# Patient Record
Sex: FEMALE | Race: BLACK OR AFRICAN AMERICAN | Employment: OTHER | URBAN - METROPOLITAN AREA
[De-identification: names, ages, dates, MRNs, and addresses within clinical notes are randomized per-mention and may not be internally consistent; named-entity substitution may affect disease eponyms.]

---

## 2019-06-12 ENCOUNTER — HOSPITAL ENCOUNTER (OUTPATIENT)
Dept: PREADMISSION TESTING | Age: 84
Discharge: HOME | End: 2019-06-12
Payer: MEDICARE

## 2019-06-12 VITALS — WEIGHT: 265 LBS | BODY MASS INDEX: 44.15 KG/M2 | HEIGHT: 65 IN

## 2019-06-12 LAB
ANION GAP SERPL CALC-SCNC: 7 MMOL/L (ref 3–18)
ATRIAL RATE: 70 BPM
BUN SERPL-MCNC: 19 MG/DL (ref 7–18)
BUN/CREAT SERPL: 13 (ref 12–20)
CALCIUM SERPL-MCNC: 9.5 MG/DL (ref 8.5–10.1)
CALCULATED P AXIS, ECG09: 56 DEGREES
CALCULATED R AXIS, ECG10: -56 DEGREES
CALCULATED T AXIS, ECG11: 59 DEGREES
CHLORIDE SERPL-SCNC: 109 MMOL/L (ref 100–108)
CO2 SERPL-SCNC: 26 MMOL/L (ref 21–32)
CREAT SERPL-MCNC: 1.46 MG/DL (ref 0.6–1.3)
DIAGNOSIS, 93000: NORMAL
GLUCOSE SERPL-MCNC: 85 MG/DL (ref 74–99)
HCT VFR BLD AUTO: 38.6 % (ref 35–45)
HGB BLD-MCNC: 11.8 G/DL (ref 12–16)
P-R INTERVAL, ECG05: 240 MS
POTASSIUM SERPL-SCNC: 4.1 MMOL/L (ref 3.5–5.5)
Q-T INTERVAL, ECG07: 402 MS
QRS DURATION, ECG06: 92 MS
QTC CALCULATION (BEZET), ECG08: 434 MS
SODIUM SERPL-SCNC: 142 MMOL/L (ref 136–145)
VENTRICULAR RATE, ECG03: 70 BPM

## 2019-06-12 PROCEDURE — 85018 HEMOGLOBIN: CPT

## 2019-06-12 PROCEDURE — 93005 ELECTROCARDIOGRAM TRACING: CPT

## 2019-06-12 PROCEDURE — 80048 BASIC METABOLIC PNL TOTAL CA: CPT

## 2019-06-12 RX ORDER — SODIUM CHLORIDE, SODIUM LACTATE, POTASSIUM CHLORIDE, CALCIUM CHLORIDE 600; 310; 30; 20 MG/100ML; MG/100ML; MG/100ML; MG/100ML
125 INJECTION, SOLUTION INTRAVENOUS CONTINUOUS
Status: CANCELLED | OUTPATIENT
Start: 2019-06-25

## 2019-06-12 RX ORDER — HYDRALAZINE HYDROCHLORIDE 50 MG/1
50 TABLET, FILM COATED ORAL 3 TIMES DAILY
COMMUNITY

## 2019-06-12 RX ORDER — ASPIRIN AND DIPYRIDAMOLE 25; 200 MG/1; MG/1
1 CAPSULE, EXTENDED RELEASE ORAL 2 TIMES DAILY
COMMUNITY

## 2019-06-12 RX ORDER — ATORVASTATIN CALCIUM 40 MG/1
40 TABLET, FILM COATED ORAL
COMMUNITY

## 2019-06-12 RX ORDER — CEFAZOLIN SODIUM/WATER 2 G/20 ML
2 SYRINGE (ML) INTRAVENOUS ONCE
Status: CANCELLED | OUTPATIENT
Start: 2019-06-25 | End: 2019-06-25

## 2019-06-12 NOTE — PERIOP NOTES
Denies sleep apnea or any complications with anesthesia hira prep reviewed patient was instructed to check with Dr Dayami Cortés PCP and hold Aggrenox per MD's instruction

## 2019-06-23 PROBLEM — N32.89 BLADDER MASS: Status: ACTIVE | Noted: 2019-06-23

## 2019-06-23 NOTE — H&P
Urology [address] [place] [address] [address]  [address]  Tel: [phone]  Fax: [phone]        Patient: Mayra Carter  YOB: 1935          Assessment/Plan  # Detail Type Description    1. Assessment Gross hematuria (R31.0). Patient Plan Her urine is sent for cytology today. She has persistent hematuria. She has an area of large raised blood vessels in the bladder and raised urothelial tissue that may or may not be malignant. We will do a cysto and TURBT small. Risk of bleeding, infection, injury and possible need for future procedures were discussed. She will proceed. This 80year old female presents for Hematuria. History of Present Illness:  1. Hematuria   The patient presents with hematuria (gross with clots). The problem began 4 weeks ago. The symptoms are intermittent. The problem is unchanged. Pertinent history includes being at least 36years of age and history of kidney stones but not history of renal cancer, history of UTIs, family history of bladder cancer, family history of renal cancer or family history of stones. Denies aggravating factors. Denies relieving factors. She denies chills, fever, nausea and vomiting. Additional information: CT (5/9/19) -- no upper tract disease; but fibroids and fluid in the endometrial canal -- she states that she had a negative biopsy with gyn already (5/30/19)      5/30/19 -- she has continued hematuria at times -- including yesterday. no pain or dysuria or new problems. PAST MEDICAL/SURGICAL HISTORY   (Reviewed, updated)    Disease/disorder Onset Date Management Date Comments     Cholecystectomy     HTN             PROBLEM LIST:   Problem List reviewed.    Problem Description Onset Date Chronic Clinical Status Notes   BMI 40.0-44.9, adult 04/11/2016 Y     TIA  Y     Benign essential hypertension 07/18/2011 Y     Morbid obesity 07/18/2011 Y           Medications (active prior to today)  Medication Instructions Start Date Stop Date Refilled Elsewhere   aspirin 25 mg-dipyridamole 200 mg capsule,ext. release 12 hr multiphase take 1 capsule by oral route 2 times every day in the morning and evening 2018 N   metoprolol tartrate 50 mg tablet take 1 tablet (50MG)  by oral route 2 times every day with meals 2018 N   hydralazine 50 mg tablet take 1 tablet by oral route 3 times every day with food 2019 N   amlodipine 10 mg tablet take 1 tablet by oral route  every day 2019 N   atorvastatin 40 mg tablet take 1 tablet by oral route  every day 2019 N   aspirin 25 mg-dipyridamole 200 mg capsule,ext. release 12 hr multiphase take 1 capsule by oral route 2 times every day in the morning and evening 2019 N     Medication Reconciliation  Medications reconciled today. Allergies  Ingredient Reaction (Severity) Medication Name Comment   ACE INHIBITORS      ARB-ANGIOTENSIN RECEPTOR ANTAGONIST      CODEINE Unknown       Reviewed, no changes. Family History  (Reviewed, updated)  Relationship Family Member Name  Age at Death Condition Onset Age Cause of Death   Father  N  Diabetes mellitus  N   Mother  N  Diabetes mellitus  N         Social History:  (Reviewed, updated)  Tobacco use reviewed. The patient is right-handed. Preferred language is [place]. Tobacco use status: Ex-cigarette smoker. Smoking status: Former smoker. SMOKING STATUS  Use Status Type Smoking Status Usage Per Day Years Used Total Pack Years   yes Cigarette Former smoker        ALCOHOL  There is no history of alcohol use. CAFFEINE  The patient uses caffeine: soda and tea. - 1 cup a day. LIFESTYLE  Sedentary activity level. Never exercises. DIET  healthy. Orthodoxy/SPIRITUAL  Patient agrees to transfusion. Review of Systems  System Neg/Pos Details   Constitutional Negative Chills and Fever. ENMT Negative Ear infections and Sore throat. Eyes Negative Blurred vision, Double vision and Eye pain. Respiratory Negative Asthma, Chronic cough, Dyspnea and Wheezing. Cardio Negative Chest pain. GI Negative Constipation, Decreased appetite, Diarrhea, Nausea and Vomiting.  Positive Hematuria. Endocrine Negative Cold intolerance, Heat intolerance, Increased thirst and Weight loss. Neuro Negative Headache and Tremors. Psych Negative Anxiety and Depression. Integumentary Negative Itching skin and Rash. MS Negative Back pain and Joint pain. Hema/Lymph Negative Easy bleeding. Vital Signs     Height  Time ft in cm Last Measured Height Position   10:54 AM 5.0 6.00 167.64 05/30/2019 Standing     Weight/BSA/BMI  Time lb oz kg Context BMI kg/m2 BSA m2   10:54 .00  113.398 dressed with shoes 40.35      Measured By  Time Measured by   10:54 AM Melvina Dawkins       Physical Exam  Exam Findings Details   Constitutional Normal Well developed. Neck Exam Normal Inspection - Normal.   Respiratory Normal Inspection - Normal.   Abdomen Normal No abdominal tenderness. Genitourinary Normal Urethral meatus - Normal. External genitalia - Normal. Glands - Normal. Perineum - Normal. Vagina - Normal. No Suprapubic tenderness. No CVA tenderness. Extremity Normal No Edema. Psychiatric Normal Orientation - Oriented to time, place, person & situation. Appropriate mood and affect. Procedures:    Cystoscopy indication:  Other. Patient consent:  Consent was obtained. The procedure and risks were explained in detail. Questions were encouraged and answered. The patient was prepped and draped in the usual sterile fashion. Procedure:  A diagnostic cystourethroscopy was performed using a 18 Croatian flexible cystoscope  Anesthesia:  No anesthesia. Patient response:  Patient tolerated procedure well. Patient was given instructions. Patient was discharged in stable condition.   Findings:  Anterior urethra normal in appearance. The bladder has lesion/mass found on the bladder. The first lesion/mass is 1.50cm is located back wall of the bladder with characteristics of sessile. Ureteral orifices normal in appearance. Antibiotics:  Antibiotics given:  Impression:  Gross hematuria R31.0. Patient Education  # Patient Education   1. Cystoscopy: Care Instructions     Medications (added, continued, or stopped today)  Start Date Medication Directions PRN Status PRN Reason Instruction Stop Date   04/23/2019 amlodipine 10 mg tablet take 1 tablet by oral route  every day N      11/26/2018 aspirin 25 mg-dipyridamole 200 mg capsule,ext. release 12 hr multiphase take 1 capsule by oral route 2 times every day in the morning and evening N      05/22/2019 aspirin 25 mg-dipyridamole 200 mg capsule,ext. release 12 hr multiphase take 1 capsule by oral route 2 times every day in the morning and evening N      05/22/2019 atorvastatin 40 mg tablet take 1 tablet by oral route  every day N      01/24/2019 hydralazine 50 mg tablet take 1 tablet by oral route 3 times every day with food N      12/26/2018 metoprolol tartrate 50 mg tablet take 1 tablet (50MG)  by oral route 2 times every day with meals N      Active Patient Care Team Members    Name Contact Agency Type Support Role Relationship Active Date Inactive Date 10 Carlosia    specialist    Ophthalmology   Meli Montez    Other Adult      Gino So   Patient provider PCP   Family Practice       Provider:       Vargas Decker MD

## 2019-06-25 ENCOUNTER — HOSPITAL ENCOUNTER (OUTPATIENT)
Age: 84
Setting detail: OUTPATIENT SURGERY
Discharge: HOME | End: 2019-06-25
Attending: UROLOGY | Admitting: UROLOGY
Payer: MEDICARE

## 2019-06-25 ENCOUNTER — ANESTHESIA EVENT (OUTPATIENT)
Dept: SURGERY | Age: 84
End: 2019-06-25
Payer: MEDICARE

## 2019-06-25 ENCOUNTER — ANESTHESIA (OUTPATIENT)
Dept: SURGERY | Age: 84
End: 2019-06-25
Payer: MEDICARE

## 2019-06-25 VITALS
OXYGEN SATURATION: 96 % | BODY MASS INDEX: 43.67 KG/M2 | TEMPERATURE: 97.4 F | RESPIRATION RATE: 18 BRPM | WEIGHT: 262.13 LBS | SYSTOLIC BLOOD PRESSURE: 139 MMHG | HEART RATE: 58 BPM | HEIGHT: 65 IN | DIASTOLIC BLOOD PRESSURE: 63 MMHG

## 2019-06-25 PROCEDURE — 74011250636 HC RX REV CODE- 250/636

## 2019-06-25 PROCEDURE — 76010000154 HC OR TIME FIRST 0.5 HR: Performed by: UROLOGY

## 2019-06-25 PROCEDURE — 74011250636 HC RX REV CODE- 250/636: Performed by: UROLOGY

## 2019-06-25 PROCEDURE — 76210000026 HC REC RM PH II 1 TO 1.5 HR: Performed by: UROLOGY

## 2019-06-25 PROCEDURE — 77030018832 HC SOL IRR H20 ICUM -A: Performed by: UROLOGY

## 2019-06-25 PROCEDURE — 76210000063 HC OR PH I REC FIRST 0.5 HR: Performed by: UROLOGY

## 2019-06-25 PROCEDURE — 77030020782 HC GWN BAIR PAWS FLX 3M -B: Performed by: UROLOGY

## 2019-06-25 PROCEDURE — 76060000031 HC ANESTHESIA FIRST 0.5 HR: Performed by: UROLOGY

## 2019-06-25 PROCEDURE — 88305 TISSUE EXAM BY PATHOLOGIST: CPT

## 2019-06-25 PROCEDURE — 77030032490 HC SLV COMPR SCD KNE COVD -B: Performed by: UROLOGY

## 2019-06-25 RX ORDER — FLUMAZENIL 0.1 MG/ML
0.2 INJECTION INTRAVENOUS
Status: DISCONTINUED | OUTPATIENT
Start: 2019-06-25 | End: 2019-06-25 | Stop reason: HOSPADM

## 2019-06-25 RX ORDER — CEFAZOLIN SODIUM/WATER 2 G/20 ML
2 SYRINGE (ML) INTRAVENOUS ONCE
Status: COMPLETED | OUTPATIENT
Start: 2019-06-25 | End: 2019-06-25

## 2019-06-25 RX ORDER — FENTANYL CITRATE 50 UG/ML
50 INJECTION, SOLUTION INTRAMUSCULAR; INTRAVENOUS
Status: DISCONTINUED | OUTPATIENT
Start: 2019-06-25 | End: 2019-06-25 | Stop reason: HOSPADM

## 2019-06-25 RX ORDER — CEPHALEXIN 500 MG/1
500 CAPSULE ORAL 2 TIMES DAILY
Qty: 6 CAP | Refills: 0 | Status: SHIPPED | OUTPATIENT
Start: 2019-06-25 | End: 2019-09-12

## 2019-06-25 RX ORDER — DEXAMETHASONE SODIUM PHOSPHATE 4 MG/ML
INJECTION, SOLUTION INTRA-ARTICULAR; INTRALESIONAL; INTRAMUSCULAR; INTRAVENOUS; SOFT TISSUE AS NEEDED
Status: DISCONTINUED | OUTPATIENT
Start: 2019-06-25 | End: 2019-06-25 | Stop reason: HOSPADM

## 2019-06-25 RX ORDER — FENTANYL CITRATE 50 UG/ML
INJECTION, SOLUTION INTRAMUSCULAR; INTRAVENOUS AS NEEDED
Status: DISCONTINUED | OUTPATIENT
Start: 2019-06-25 | End: 2019-06-25 | Stop reason: HOSPADM

## 2019-06-25 RX ORDER — LIDOCAINE HYDROCHLORIDE 20 MG/ML
INJECTION, SOLUTION EPIDURAL; INFILTRATION; INTRACAUDAL; PERINEURAL AS NEEDED
Status: DISCONTINUED | OUTPATIENT
Start: 2019-06-25 | End: 2019-06-25 | Stop reason: HOSPADM

## 2019-06-25 RX ORDER — ONDANSETRON 2 MG/ML
4 INJECTION INTRAMUSCULAR; INTRAVENOUS ONCE
Status: DISCONTINUED | OUTPATIENT
Start: 2019-06-25 | End: 2019-06-25 | Stop reason: HOSPADM

## 2019-06-25 RX ORDER — SODIUM CHLORIDE, SODIUM LACTATE, POTASSIUM CHLORIDE, CALCIUM CHLORIDE 600; 310; 30; 20 MG/100ML; MG/100ML; MG/100ML; MG/100ML
125 INJECTION, SOLUTION INTRAVENOUS CONTINUOUS
Status: DISCONTINUED | OUTPATIENT
Start: 2019-06-25 | End: 2019-06-25 | Stop reason: HOSPADM

## 2019-06-25 RX ORDER — NALOXONE HYDROCHLORIDE 0.4 MG/ML
0.4 INJECTION, SOLUTION INTRAMUSCULAR; INTRAVENOUS; SUBCUTANEOUS AS NEEDED
Status: DISCONTINUED | OUTPATIENT
Start: 2019-06-25 | End: 2019-06-25 | Stop reason: HOSPADM

## 2019-06-25 RX ORDER — SODIUM CHLORIDE, SODIUM LACTATE, POTASSIUM CHLORIDE, CALCIUM CHLORIDE 600; 310; 30; 20 MG/100ML; MG/100ML; MG/100ML; MG/100ML
100 INJECTION, SOLUTION INTRAVENOUS CONTINUOUS
Status: DISCONTINUED | OUTPATIENT
Start: 2019-06-25 | End: 2019-06-25 | Stop reason: HOSPADM

## 2019-06-25 RX ORDER — PROPOFOL 10 MG/ML
INJECTION, EMULSION INTRAVENOUS AS NEEDED
Status: DISCONTINUED | OUTPATIENT
Start: 2019-06-25 | End: 2019-06-25 | Stop reason: HOSPADM

## 2019-06-25 RX ORDER — ONDANSETRON 2 MG/ML
INJECTION INTRAMUSCULAR; INTRAVENOUS AS NEEDED
Status: DISCONTINUED | OUTPATIENT
Start: 2019-06-25 | End: 2019-06-25 | Stop reason: HOSPADM

## 2019-06-25 RX ADMIN — DEXAMETHASONE SODIUM PHOSPHATE 4 MG: 4 INJECTION, SOLUTION INTRA-ARTICULAR; INTRALESIONAL; INTRAMUSCULAR; INTRAVENOUS; SOFT TISSUE at 09:37

## 2019-06-25 RX ADMIN — Medication 2 G: at 09:34

## 2019-06-25 RX ADMIN — PROPOFOL 120 MG: 10 INJECTION, EMULSION INTRAVENOUS at 09:31

## 2019-06-25 RX ADMIN — ONDANSETRON 4 MG: 2 INJECTION INTRAMUSCULAR; INTRAVENOUS at 09:37

## 2019-06-25 RX ADMIN — LIDOCAINE HYDROCHLORIDE 100 MG: 20 INJECTION, SOLUTION EPIDURAL; INFILTRATION; INTRACAUDAL; PERINEURAL at 09:31

## 2019-06-25 RX ADMIN — FENTANYL CITRATE 50 MCG: 50 INJECTION, SOLUTION INTRAMUSCULAR; INTRAVENOUS at 09:40

## 2019-06-25 RX ADMIN — SODIUM CHLORIDE, SODIUM LACTATE, POTASSIUM CHLORIDE, AND CALCIUM CHLORIDE 125 ML/HR: 600; 310; 30; 20 INJECTION, SOLUTION INTRAVENOUS at 11:47

## 2019-06-25 RX ADMIN — SODIUM CHLORIDE, SODIUM LACTATE, POTASSIUM CHLORIDE, AND CALCIUM CHLORIDE 125 ML/HR: 600; 310; 30; 20 INJECTION, SOLUTION INTRAVENOUS at 08:26

## 2019-06-25 NOTE — PERIOP NOTES
Reviewed PTA medication list with patient/caregiver and patient/caregiver denies any additional medications. Patient admits to having a responsible adult care for them at home for at least 24 hours after surgery. Patient denies henri chewing/swallowing difficulties. Patient encouraged to use Sahil paws warming system and informed that using Sahil paws to regulate body temperature prior to a procedure has been shown to help reduce the risks of blood clots and infection. Dual skin assessment & fall risk band verification completed with Maria Elena Goodson RN.

## 2019-06-25 NOTE — BRIEF OP NOTE
BRIEF OPERATIVE NOTE    Date of Procedure: 6/25/2019   Preoperative Diagnosis: GROSS HEMATURIA, NEOPLASM OF UNCERTAIN BEHAVIOR OF BLADDER  Postoperative Diagnosis: GROSS HEMATURIA, NEOPLASM OF UNCERTAIN BEHAVIOR OF BLADDER    Procedure(s):  CYSTOSCOPY,TRANSURETHRAL RESECTION OF BLADDER TUMOR-SMALL  Surgeon(s) and Role:     * Lisa Valdes MD - Primary         Surgical Assistant: NONE    Surgical Staff:  Circ-1: Diamante Lynn  Scrub Tech-1: Sarah Diallo  Scrub Tech-2: Gabrielle Obando  Event Time In Time Out   Incision Start 6172    Incision Close 0204      Anesthesia: General   Estimated Blood Loss: MINIMAL  Specimens:   ID Type Source Tests Collected by Time Destination   1 : bladder erythema Preservative Bladder  Lisa Valdes MD 6/25/2019 9530 Pathology      Findings: AREA OF SLIGHTLY RAISED ERYTHEMA ON POSTERIOR WALL TOWARD DOME WAS RESECTED AND CAUTERIZED   Complications: NONE  Implants: * No implants in log *

## 2019-06-25 NOTE — PERIOP NOTES
Dr. Saintclair Funk at bedside to observe pt neuro status, pt is stable and Dr. Saintclair Funk signed pt out of PACU to go to phase 2 of care

## 2019-06-25 NOTE — ADDENDUM NOTE
Addendum  created 06/25/19 1202 by Caroline Monroy CRNA    Intraprocedure LDAs edited, LDA properties accepted

## 2019-06-25 NOTE — PERIOP NOTES
TRANSFER - OUT REPORT:    Verbal report given to Antony Seip, RN (name) on Greg Shen  being transferred to phase 2 (unit) for routine post - op       Report consisted of patients Situation, Background, Assessment and   Recommendations(SBAR). Information from the following report(s) SBAR, Intake/Output and MAR was reviewed with the receiving nurse. Lines:   Peripheral IV 06/25/19 Left; Anterior Wrist (Active)   Site Assessment Clean, dry, & intact 6/25/2019 10:19 AM   Phlebitis Assessment 0 6/25/2019 10:19 AM   Infiltration Assessment 0 6/25/2019 10:19 AM   Dressing Status Clean, dry, & intact 6/25/2019 10:19 AM   Dressing Type Transparent;Tape 6/25/2019 10:19 AM   Hub Color/Line Status Pink; Infusing 6/25/2019 10:19 AM   Action Taken Armboard 6/25/2019  9:39 AM   Alcohol Cap Used No 6/25/2019  8:25 AM        Opportunity for questions and clarification was provided.       Patient transported with:   [employer]

## 2019-06-25 NOTE — PERIOP NOTES
Arrived to Phase 2 - alert with slow responses - ( normal for pt post stroke in 2015 ) oriented to person , place - assisted to recliner - no c/o offered

## 2019-06-25 NOTE — PERIOP NOTES
Patient resting on chair; new bag of IVF infusing and patient tolerating PO fluids. Patient and family aware that she needs to urinate prior to d/c home; denies need for bathroom at this time.

## 2019-06-25 NOTE — INTERVAL H&P NOTE
H&P Update: 
Mel Lackey was seen and examined. History and physical has been reviewed. The patient has been examined.  There have been no significant clinical changes since the completion of the originally dated History and Physical.

## 2019-06-25 NOTE — DISCHARGE INSTRUCTIONS
Drink plenty of fluids to keep the urine clear  Resume pre hospital diet  Ambulate in house  Shower tomorrow  Take prescription as directed  Dr. Ross Jha office will call with follow up appointment        DISCHARGE SUMMARY from Nurse    PATIENT INSTRUCTIONS:    After general anesthesia or intravenous sedation, for 24 hours or while taking prescription Narcotics:  · Limit your activities  · Do not drive and operate hazardous machinery  · Do not make important personal or business decisions  · Do  not drink alcoholic beverages  · If you have not urinated within 8 hours after discharge, please contact your surgeon on call. Report the following to your surgeon:  · Excessive pain, swelling, redness or odor of or around the surgical area  · Temperature over 100.5  · Nausea and vomiting lasting longer than 4 hours or if unable to take medications  · Any signs of decreased circulation or nerve impairment to extremity: change in color, persistent  numbness, tingling, coldness or increase pain  · Any questions    What to do at Home:  Recommended activity: Ambulate in house and No driving while on analgesics,     If you experience any of the following symptoms fever, chills, uncontrollable pain , active bleeding , difficulty urinating, please follow up with Dr. Nikolay Maria. *  Please give a list of your current medications to your Primary Care Provider. *  Please update this list whenever your medications are discontinued, doses are      changed, or new medications (including over-the-counter products) are added. *  Please carry medication information at all times in case of emergency situations. These are general instructions for a healthy lifestyle:    No smoking/ No tobacco products/ Avoid exposure to second hand smoke  Surgeon General's Warning:  Quitting smoking now greatly reduces serious risk to your health.     Obesity, smoking, and sedentary lifestyle greatly increases your risk for illness    A healthy diet, regular physical exercise & weight monitoring are important for maintaining a healthy lifestyle    You may be retaining fluid if you have a history of heart failure or if you experience any of the following symptoms:  Weight gain of 3 pounds or more overnight or 5 pounds in a week, increased swelling in our hands or feet or shortness of breath while lying flat in bed. Please call your doctor as soon as you notice any of these symptoms; do not wait until your next office visit. Patient armband removed and shredded    The discharge information has been reviewed with the patient and caregiver. The patient and caregiver verbalized understanding. Discharge medications reviewed with the patient and caregiver and appropriate educational materials and side effects teaching were provided.   ___________________________________________________________________________________________________________________________________

## 2019-06-25 NOTE — ANESTHESIA POSTPROCEDURE EVALUATION
Post-Anesthesia Evaluation & Assessment    Visit Vitals  /65   Pulse (!) 57   Temp 37.6 °C (99.7 °F)   Resp 16   Ht 5' 5\" (1.651 m)   Wt 118.9 kg (262 lb 2 oz)   SpO2 94%   BMI 43.62 kg/m²       Nausea/Vomiting: Controlled. Post-operative hydration adequate. Pain Scale 1: Numeric (0 - 10) (06/25/19 1017)  Pain Intensity 1: 0 (06/25/19 1017)   Managed    Pain score at or below stated goal level. Mental status & Level of consciousness: alert and oriented x 3    Neurological status: moves all extremities, sensation grossly intact    Pulmonary status: airway patent, adequate oxygenation. Complications related to anesthesia: none    Patient has met all PACU discharge requirements.       Kamryn Parra, DO

## 2019-06-25 NOTE — PERIOP NOTES
Discharge instructions completed - opportunity for questions - AVS med list reviewed - pt assisted  to BR - voided 225 ml clear yellow urine

## 2019-06-25 NOTE — ANESTHESIA PREPROCEDURE EVALUATION
Relevant Problems   No relevant active problems       Anesthetic History   No history of anesthetic complications            Review of Systems / Medical History  Patient summary reviewed, nursing notes reviewed and pertinent labs reviewed    Pulmonary              Pertinent negatives: No COPD, asthma, recent URI and sleep apnea  Comments: Former smoker   Neuro/Psych       CVA  TIA  Pertinent negatives: No seizures and neuromuscular disease   Cardiovascular    Hypertension: well controlled            Pertinent negatives: No past MI, CAD, PAD, dysrhythmias, angina and CHF  Exercise tolerance: <4 METS     GI/Hepatic/Renal     GERD        Pertinent negatives: No hepatitis, liver disease and renal disease  Comments: Rare, food specific GERD Endo/Other        Morbid obesity  Pertinent negatives: No diabetes, hypothyroidism, hyperthyroidism and blood dyscrasia   Other Findings            Physical Exam    Airway  Mallampati: III  TM Distance: 4 - 6 cm  Neck ROM: decreased range of motion   Mouth opening: Normal     Cardiovascular  Regular rate and rhythm,  S1 and S2 normal,  no murmur, click, rub, or gallop             Dental    Dentition: Edentulous     Pulmonary  Breath sounds clear to auscultation               Abdominal  GI exam deferred       Other Findings            Anesthetic Plan    ASA: 3  Anesthesia type: general          Induction: Intravenous  Anesthetic plan and risks discussed with: Patient and Family      GA/LMA

## 2019-06-26 NOTE — OP NOTES
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 OPERATION PERFORMED:  Cystoscopy, transurethral resection of bladder tumor, small. SURGEON:  Amara Ghosh MD    ASSISTANT:  None. ANESTHESIA:  General.    COMPLICATIONS:  None. SPECIMENS REMOVED:  Bladder erythema. IMPLANTS:  None. ESTIMATED BLOOD LOSS:  Minimal.    FINDINGS:  The patient had an area of slightly raised erythema on posterior wall towards the dome that was resected and cauterized. CLINICAL NOTE:  The patient is an 43-year-old female with a history of gross hematuria, who was found to have an area of raised erythema, who presents for TURBT. The area was about 1 cm in size. OPERATIVE REPORT:  Preoperatively, the risks and benefits of surgery were described to the patient. The risks included not limited to bleeding, infection, injury to the bladder, injury to the urethra, injury to the ureter, possible need for future procedures. The patient understood the risks and signed the informed consent. The patient was taken to the operating room, placed on the OR table in supine position. She was administered general anesthetic. She was administered intravenous antibiotics. She was placed in dorsal lithotomy position and prepped and draped in the usual sterile manner. A 21-Mohawk cystoscope and sheath were then inserted transurethrally atraumatically under direct vision using a 30-degree lens. Panendoscopy was done. The urethra was normal.  The bladder showed bilateral ureteral orifices in normal anatomic position. There were no stones within the bladder. There were no papillary masses.   There was an abnormal area of raised erythema 1 cm in diameter on the posterior bladder wall shading towards the left side towards the dome. This actually looked a little bit better than in the office a couple of weeks prior. There were 2 large feeding blood vessels into the area coming from the lateral sides. Using cup biopsy forceps, this area was resected in the usual manner. The entire area was resected. All bleeding was then controlled with Bugbee electrocautery. The area was inspected. There was no residual erythema left and no areas of concern, otherwise. There was no active bleeding. The patient had her bladder emptied and the area was reinspected again and there was no bleeding. At this point, the patient had the bladder emptied and the scope removed. The patient was taken out of the lithotomy position, revived from anesthesia, and taken to recovery in a stable condition.       MD GAURANG [email or web address]/[email or web address]  D:  06/25/2019 10:19  T:  06/25/2019 11:06  JOB #:  [id number]

## 2019-09-12 ENCOUNTER — HOSPITAL ENCOUNTER (OUTPATIENT)
Dept: PREADMISSION TESTING | Age: 84
Discharge: HOME | End: 2019-09-12
Payer: MEDICARE

## 2019-09-12 LAB
ALBUMIN SERPL-MCNC: 3.2 G/DL (ref 3.4–5)
ALBUMIN/GLOB SERPL: 0.8 {RATIO} (ref 0.8–1.7)
ALP SERPL-CCNC: 153 U/L (ref 45–117)
ALT SERPL-CCNC: 41 U/L (ref 13–56)
ANION GAP SERPL CALC-SCNC: 3 MMOL/L (ref 3–18)
AST SERPL-CCNC: 34 U/L (ref 10–38)
ATRIAL RATE: 69 BPM
BASOPHILS # BLD: 0 K/UL (ref 0–0.1)
BASOPHILS NFR BLD: 0 % (ref 0–2)
BILIRUB SERPL-MCNC: 0.4 MG/DL (ref 0.2–1)
BUN SERPL-MCNC: 18 MG/DL (ref 7–18)
BUN/CREAT SERPL: 13 (ref 12–20)
CALCIUM SERPL-MCNC: 9.1 MG/DL (ref 8.5–10.1)
CALCULATED P AXIS, ECG09: 48 DEGREES
CALCULATED R AXIS, ECG10: -52 DEGREES
CALCULATED T AXIS, ECG11: 58 DEGREES
CHLORIDE SERPL-SCNC: 110 MMOL/L (ref 100–111)
CO2 SERPL-SCNC: 28 MMOL/L (ref 21–32)
CREAT SERPL-MCNC: 1.41 MG/DL (ref 0.6–1.3)
DIAGNOSIS, 93000: NORMAL
DIFFERENTIAL METHOD BLD: ABNORMAL
EOSINOPHIL # BLD: 0.1 K/UL (ref 0–0.4)
EOSINOPHIL NFR BLD: 1 % (ref 0–5)
ERYTHROCYTE [DISTWIDTH] IN BLOOD BY AUTOMATED COUNT: 17.7 % (ref 11.6–14.5)
GLOBULIN SER CALC-MCNC: 4 G/DL (ref 2–4)
GLUCOSE SERPL-MCNC: 89 MG/DL (ref 74–99)
HCT VFR BLD AUTO: 39.2 % (ref 35–45)
HGB BLD-MCNC: 12.4 G/DL (ref 12–16)
LYMPHOCYTES # BLD: 2 K/UL (ref 0.9–3.6)
LYMPHOCYTES NFR BLD: 30 % (ref 21–52)
MCH RBC QN AUTO: 28.1 PG (ref 24–34)
MCHC RBC AUTO-ENTMCNC: 31.6 G/DL (ref 31–37)
MCV RBC AUTO: 88.9 FL (ref 74–97)
MONOCYTES # BLD: 0.7 K/UL (ref 0.05–1.2)
MONOCYTES NFR BLD: 10 % (ref 3–10)
NEUTS SEG # BLD: 4 K/UL (ref 1.8–8)
NEUTS SEG NFR BLD: 59 % (ref 40–73)
P-R INTERVAL, ECG05: 228 MS
PLATELET # BLD AUTO: 220 K/UL (ref 135–420)
PMV BLD AUTO: 10 FL (ref 9.2–11.8)
POTASSIUM SERPL-SCNC: 4.2 MMOL/L (ref 3.5–5.5)
PROT SERPL-MCNC: 7.2 G/DL (ref 6.4–8.2)
Q-T INTERVAL, ECG07: 404 MS
QRS DURATION, ECG06: 90 MS
QTC CALCULATION (BEZET), ECG08: 432 MS
RBC # BLD AUTO: 4.41 M/UL (ref 4.2–5.3)
SODIUM SERPL-SCNC: 141 MMOL/L (ref 136–145)
VENTRICULAR RATE, ECG03: 69 BPM
WBC # BLD AUTO: 6.8 K/UL (ref 4.6–13.2)

## 2019-09-12 PROCEDURE — 80053 COMPREHEN METABOLIC PANEL: CPT

## 2019-09-12 PROCEDURE — 93005 ELECTROCARDIOGRAM TRACING: CPT

## 2019-09-12 PROCEDURE — 85025 COMPLETE CBC W/AUTO DIFF WBC: CPT

## 2019-09-12 PROCEDURE — 36415 COLL VENOUS BLD VENIPUNCTURE: CPT

## 2019-09-12 NOTE — PERIOP NOTES
Called office ,LM for Lobito Marinelli requesting Hp and Consents to be fax over as well as I fax over request forms for them to.

## 2019-09-13 NOTE — H&P (VIEW-ONLY)
[address]  [address]  Tel: [phone]  Fax: [phone]      PATIENT:  Elease Aschoff  YOB: 1935  DATE:   07/25/2019 4:45 PM   VISIT TYPE: Office Visit - GYN        Assessment/Plan  # Detail Type Description    1. Assessment Postmenopausal bleeding (N95.0). Patient Plan US shows potential polyp. Reviewed with patient. Recommended D&C/Hysteroscopy Patient will schedule for surgery. Reviewed risks. 2. Assessment Body mass index (BMI) 40.0-44.9, adult (Z68.41). This 80year old female presents for Postmenopausal bleeding:. History of Present Illness:  1. Postmenopausal bleeding: The symptoms began 3 weeks ago and generally lasts varies. The symptoms are reported as being mild. The symptoms occur constantly. The location is uterine. The symptoms are described as painless. Aggravating factors include none. Relieving factors include none. She states the symptoms are acute and are of new onset. pt here for eval for bleeding not related to hematuria. pt referred by Dr. Kj Albert for further eval and tx.               Past Systemic History    Medical History (Detailed)  Disease Onset Date Comments   HTN         Surgical History/Management (Detailed)  Management Date Comments   Cholecystectomy     Diagnostics History:  Status Study Ordered Completed Interpretation  Result / Report   completed * Dexa, Bone Density Scan 04/06/2015 04/28/2015 normal  see module   completed * Screening mammography digital 04/06/2015 05/08/2015 normal  see module   completed * Screening mammography digital Bilateral 09/17/2013 10/01/2013 normal  see scanned report   completed COLONOSCOPY AND BIOPSY  07/24/2013 REFUSED  REFUSED   result received CT Abdomen And Pelvis W/and W/O 05/06/2019  See module     ordered Screening mammography digital 09/11/2017             Interim History:  Type Date Problem Management    06/08/2015 TIA      PROBLEM LIST: Problem List reviewed. Problem Description Onset Date Chronic Clinical Status Notes   BMI 40.0-44.9, adult 2016 Y     TIA  Y     Benign essential hypertension 2011 Y     Morbid obesity 2011 Y           Medications (active prior to today)  Medication Instructions Start Date Stop Date Refilled Elsewhere   aspirin 25 mg-dipyridamole 200 mg capsule,ext. release 12 hr multiphase take 1 capsule by oral route 2 times every day in the morning and evening 2018 N   hydralazine 50 mg tablet take 1 tablet by oral route 3 times every day with food 2019 N   amlodipine 10 mg tablet take 1 tablet by oral route  every day 2019 N   atorvastatin 40 mg tablet take 1 tablet by oral route  every day 2019 N   aspirin 25 mg-dipyridamole 200 mg capsule,ext. release 12 hr multiphase take 1 capsule by oral route 2 times every day in the morning and evening 2019 N   metoprolol tartrate 50 mg tablet take 1 tablet (50MG)  by oral route 2 times every day with meals 2019 N     Medication Reconciliation  Medications reconciled today. Allergies:  Ingredient Reaction (Severity) Medication Name Comment   ACE INHIBITORS      ARB-ANGIOTENSIN RECEPTOR ANTAGONIST      CODEINE Unknown         Family History  (Detailed)  Relationship Family Member Name  Age at Death Condition Onset Age Cause of Death   Father  N  Diabetes mellitus  N   Mother  N  Diabetes mellitus  N     Social History:  (Detailed)  The patient is right-handed. Preferred language is [place]. Tobacco use status: Ex-cigarette smoker. Smoking status: Former smoker. SMOKING STATUS  Use Status Type Smoking Status Usage Per Day Years Used Total Pack Years   yes Cigarette Former smoker        ALCOHOL  There is no history of alcohol use. CAFFEINE  The patient uses caffeine: soda and tea. - 1 cup a day.   LIFESTYLE  Sedentary activity level.  Never exercises. DIET  healthy. Latter day/SPIRITUAL  Patient agrees to transfusion. Review of Systems  System Neg/Pos Details   Constitutional Negative Fever, Night sweats, Weight gain and Weight loss. Respiratory Negative Dyspnea. Cardio Negative Chest pain and Edema. GI Negative Abdominal pain and Constipation.  Negative Dysuria, Hematuria, Urinary frequency and Urinary incontinence. Endocrine Negative Cold intolerance and Heat intolerance. Neuro Negative Headache. Psych Negative Anxiety and Depression. Integumentary Negative Rash. MS Negative Back pain. Hema/Lymph Negative Easy bleeding and Easy bruising. Reproductive Negative Dysmenorrhea, Dyspareunia, Hot flashes, Irregular menses and Vaginal discharge. Vital Signs     Height  Time ft in cm Last Measured Height Position   5:16 PM 5.0 6.00 167.64 07/25/2019 Standing     Weight/BSA/BMI  Time lb oz kg Context BMI kg/m2 BSA m2   5:16 .00  117.934  41.96 2.34     Blood Pressure  Time BP mm/Hg Position Side Site Method Cuff Size   5:16 /80 sitting left   adult large     Measured By  Time Measured by   5:16 PM Margarita Murrieta       Physical Exam  Exam Findings Details   Constitutional Normal Well developed. Neck Exam Normal Palpation - Normal. Thyroid gland - Normal.   Abdomen Normal Anterior palpation -  No rebound. No abdominal tenderness. No hernia. Genitourinary * External genitalia - atrophic. Obesity limits exam accuracy. Genitourinary Normal Urethral meatus - Normal. Glands - Normal. Perineum - Normal. Anus - Normal. Vagina - Normal. Cervix - Normal. Uterus - Normal. Adnexa - Normal. Bladder - Normal.   Skin Normal Inspection - Normal.   Spine * Inspection - no abnormality. Psychiatric Normal Orientation - Oriented to time, place, person & situation. Appropriate mood and affect.          Medications (Added, Continued or Stopped today):  Start Date Medication Directions PRN Status PRN Reason Instruction Stop Date   04/23/2019 amlodipine 10 mg tablet take 1 tablet by oral route  every day N      11/26/2018 aspirin 25 mg-dipyridamole 200 mg capsule,ext. release 12 hr multiphase take 1 capsule by oral route 2 times every day in the morning and evening N      05/22/2019 aspirin 25 mg-dipyridamole 200 mg capsule,ext. release 12 hr multiphase take 1 capsule by oral route 2 times every day in the morning and evening N   07/25/2019 05/22/2019 atorvastatin 40 mg tablet take 1 tablet by oral route  every day N      01/24/2019 hydralazine 50 mg tablet take 1 tablet by oral route 3 times every day with food N   07/29/2019 07/29/2019 HYDRALAZINE 50 MG TABLET take 1 tablet by mouth three times a day N      06/21/2019 metoprolol tartrate 50 mg tablet take 1 tablet (50MG)  by oral route 2 times every day with meals N            The patient was checked out at 5:34 PM by Pipo Slater. Active Patient Care Team Members    Name Contact Agency Type Support Role Relationship Active Date Inactive Date Specialty    [facility]   specialist    Ophthalmology   Randy Call    Other Adult      Tammy Castañeda   Patient provider PCP   Family Practice     Provider: Hilary Burkitt. Raudel Aguirre DO 07/25/2019 04:45 PM  Document generated by:  Hilary Burkitt. Raudel Shall 08/29/2019 11:30 PM    Electronically signed by Hilary Burkitt. Morrison DO on 08/30/2019 06:44 AM trying to walk up,movement

## 2019-09-13 NOTE — H&P
[address]  [address]  Tel: [phone]  Fax: [phone]      PATIENT:  Julieta Henriquez  YOB: 1935  DATE:   07/25/2019 4:45 PM   VISIT TYPE: Office Visit - GYN        Assessment/Plan  # Detail Type Description    1. Assessment Postmenopausal bleeding (N95.0). Patient Plan US shows potential polyp. Reviewed with patient. Recommended D&C/Hysteroscopy Patient will schedule for surgery. Reviewed risks. 2. Assessment Body mass index (BMI) 40.0-44.9, adult (Z68.41). This 80year old female presents for Postmenopausal bleeding:. History of Present Illness:  1. Postmenopausal bleeding: The symptoms began 3 weeks ago and generally lasts varies. The symptoms are reported as being mild. The symptoms occur constantly. The location is uterine. The symptoms are described as painless. Aggravating factors include none. Relieving factors include none. She states the symptoms are acute and are of new onset. pt here for eval for bleeding not related to hematuria. pt referred by Dr. Tania Rdz for further eval and tx.               Past Systemic History    Medical History (Detailed)  Disease Onset Date Comments   HTN         Surgical History/Management (Detailed)  Management Date Comments   Cholecystectomy     Diagnostics History:  Status Study Ordered Completed Interpretation  Result / Report   completed * Dexa, Bone Density Scan 04/06/2015 04/28/2015 normal  see module   completed * Screening mammography digital 04/06/2015 05/08/2015 normal  see module   completed * Screening mammography digital Bilateral 09/17/2013 10/01/2013 normal  see scanned report   completed COLONOSCOPY AND BIOPSY  07/24/2013 REFUSED  REFUSED   result received CT Abdomen And Pelvis W/and W/O 05/06/2019  See module     ordered Screening mammography digital 09/11/2017             Interim History:  Type Date Problem Management    06/08/2015 TIA      PROBLEM LIST: Problem List reviewed. Problem Description Onset Date Chronic Clinical Status Notes   BMI 40.0-44.9, adult 2016 Y     TIA  Y     Benign essential hypertension 2011 Y     Morbid obesity 2011 Y           Medications (active prior to today)  Medication Instructions Start Date Stop Date Refilled Elsewhere   aspirin 25 mg-dipyridamole 200 mg capsule,ext. release 12 hr multiphase take 1 capsule by oral route 2 times every day in the morning and evening 2018 N   hydralazine 50 mg tablet take 1 tablet by oral route 3 times every day with food 2019 N   amlodipine 10 mg tablet take 1 tablet by oral route  every day 2019 N   atorvastatin 40 mg tablet take 1 tablet by oral route  every day 2019 N   aspirin 25 mg-dipyridamole 200 mg capsule,ext. release 12 hr multiphase take 1 capsule by oral route 2 times every day in the morning and evening 2019 N   metoprolol tartrate 50 mg tablet take 1 tablet (50MG)  by oral route 2 times every day with meals 2019 N     Medication Reconciliation  Medications reconciled today. Allergies:  Ingredient Reaction (Severity) Medication Name Comment   ACE INHIBITORS      ARB-ANGIOTENSIN RECEPTOR ANTAGONIST      CODEINE Unknown         Family History  (Detailed)  Relationship Family Member Name  Age at Death Condition Onset Age Cause of Death   Father  N  Diabetes mellitus  N   Mother  N  Diabetes mellitus  N     Social History:  (Detailed)  The patient is right-handed. Preferred language is [place]. Tobacco use status: Ex-cigarette smoker. Smoking status: Former smoker. SMOKING STATUS  Use Status Type Smoking Status Usage Per Day Years Used Total Pack Years   yes Cigarette Former smoker        ALCOHOL  There is no history of alcohol use. CAFFEINE  The patient uses caffeine: soda and tea. - 1 cup a day.   LIFESTYLE  Sedentary activity level.  Never exercises. DIET  healthy. Oriental orthodox/SPIRITUAL  Patient agrees to transfusion. Review of Systems  System Neg/Pos Details   Constitutional Negative Fever, Night sweats, Weight gain and Weight loss. Respiratory Negative Dyspnea. Cardio Negative Chest pain and Edema. GI Negative Abdominal pain and Constipation.  Negative Dysuria, Hematuria, Urinary frequency and Urinary incontinence. Endocrine Negative Cold intolerance and Heat intolerance. Neuro Negative Headache. Psych Negative Anxiety and Depression. Integumentary Negative Rash. MS Negative Back pain. Hema/Lymph Negative Easy bleeding and Easy bruising. Reproductive Negative Dysmenorrhea, Dyspareunia, Hot flashes, Irregular menses and Vaginal discharge. Vital Signs     Height  Time ft in cm Last Measured Height Position   5:16 PM 5.0 6.00 167.64 07/25/2019 Standing     Weight/BSA/BMI  Time lb oz kg Context BMI kg/m2 BSA m2   5:16 .00  117.934  41.96 2.34     Blood Pressure  Time BP mm/Hg Position Side Site Method Cuff Size   5:16 /80 sitting left   adult large     Measured By  Time Measured by   5:16 PM Michelle Pale       Physical Exam  Exam Findings Details   Constitutional Normal Well developed. Neck Exam Normal Palpation - Normal. Thyroid gland - Normal.   Abdomen Normal Anterior palpation -  No rebound. No abdominal tenderness. No hernia. Genitourinary * External genitalia - atrophic. Obesity limits exam accuracy. Genitourinary Normal Urethral meatus - Normal. Glands - Normal. Perineum - Normal. Anus - Normal. Vagina - Normal. Cervix - Normal. Uterus - Normal. Adnexa - Normal. Bladder - Normal.   Skin Normal Inspection - Normal.   Spine * Inspection - no abnormality. Psychiatric Normal Orientation - Oriented to time, place, person & situation. Appropriate mood and affect.          Medications (Added, Continued or Stopped today):  Start Date Medication Directions PRN Status PRN Reason Instruction Stop Date   04/23/2019 amlodipine 10 mg tablet take 1 tablet by oral route  every day N      11/26/2018 aspirin 25 mg-dipyridamole 200 mg capsule,ext. release 12 hr multiphase take 1 capsule by oral route 2 times every day in the morning and evening N      05/22/2019 aspirin 25 mg-dipyridamole 200 mg capsule,ext. release 12 hr multiphase take 1 capsule by oral route 2 times every day in the morning and evening N   07/25/2019 05/22/2019 atorvastatin 40 mg tablet take 1 tablet by oral route  every day N      01/24/2019 hydralazine 50 mg tablet take 1 tablet by oral route 3 times every day with food N   07/29/2019 07/29/2019 HYDRALAZINE 50 MG TABLET take 1 tablet by mouth three times a day N      06/21/2019 metoprolol tartrate 50 mg tablet take 1 tablet (50MG)  by oral route 2 times every day with meals N            The patient was checked out at 5:34 PM by Shonda Hanson. Active Patient Care Team Members    Name Contact Agency Type Support Role Relationship Active Date Inactive Date Specialty    [facility]   specialist    Ophthalmology   [facility]    Other Adult      Ramos Angelica   Patient provider PCP   Family Practice     Provider: Dustin Will. Moe Trujillo DO 07/25/2019 04:45 PM  Document generated by:  Dustin Will. Moe Trujillo 08/29/2019 11:30 PM    Electronically signed by Dustin Will.  Felipe CHANDRA on 08/30/2019 06:44 AM

## 2019-09-15 ENCOUNTER — ANESTHESIA EVENT (OUTPATIENT)
Dept: SURGERY | Age: 84
End: 2019-09-15
Payer: MEDICARE

## 2019-09-15 RX ORDER — SODIUM CHLORIDE 0.9 % (FLUSH) 0.9 %
5-40 SYRINGE (ML) INJECTION EVERY 8 HOURS
Status: CANCELLED | OUTPATIENT
Start: 2019-09-15

## 2019-09-15 RX ORDER — FENTANYL CITRATE 50 UG/ML
50 INJECTION, SOLUTION INTRAMUSCULAR; INTRAVENOUS
Status: CANCELLED | OUTPATIENT
Start: 2019-09-15

## 2019-09-15 RX ORDER — NALOXONE HYDROCHLORIDE 0.4 MG/ML
0.1 INJECTION, SOLUTION INTRAMUSCULAR; INTRAVENOUS; SUBCUTANEOUS AS NEEDED
Status: CANCELLED | OUTPATIENT
Start: 2019-09-15

## 2019-09-15 RX ORDER — FENTANYL CITRATE 50 UG/ML
25 INJECTION, SOLUTION INTRAMUSCULAR; INTRAVENOUS AS NEEDED
Status: CANCELLED | OUTPATIENT
Start: 2019-09-15

## 2019-09-15 RX ORDER — FLUMAZENIL 0.1 MG/ML
0.2 INJECTION INTRAVENOUS
Status: CANCELLED | OUTPATIENT
Start: 2019-09-15

## 2019-09-15 RX ORDER — MAGNESIUM SULFATE 100 %
4 CRYSTALS MISCELLANEOUS AS NEEDED
Status: CANCELLED | OUTPATIENT
Start: 2019-09-15

## 2019-09-15 RX ORDER — SODIUM CHLORIDE, SODIUM LACTATE, POTASSIUM CHLORIDE, CALCIUM CHLORIDE 600; 310; 30; 20 MG/100ML; MG/100ML; MG/100ML; MG/100ML
1000 INJECTION, SOLUTION INTRAVENOUS CONTINUOUS
Status: CANCELLED | OUTPATIENT
Start: 2019-09-15

## 2019-09-15 RX ORDER — SODIUM CHLORIDE 0.9 % (FLUSH) 0.9 %
5-40 SYRINGE (ML) INJECTION AS NEEDED
Status: CANCELLED | OUTPATIENT
Start: 2019-09-15

## 2019-09-15 RX ORDER — DEXTROSE MONOHYDRATE 100 MG/ML
125-250 INJECTION, SOLUTION INTRAVENOUS AS NEEDED
Status: CANCELLED | OUTPATIENT
Start: 2019-09-15

## 2019-09-16 ENCOUNTER — ANESTHESIA (OUTPATIENT)
Dept: SURGERY | Age: 84
End: 2019-09-16
Payer: MEDICARE

## 2019-09-16 ENCOUNTER — HOSPITAL ENCOUNTER (OUTPATIENT)
Age: 84
Setting detail: OUTPATIENT SURGERY
Discharge: HOME | End: 2019-09-16
Attending: OBSTETRICS & GYNECOLOGY | Admitting: OBSTETRICS & GYNECOLOGY
Payer: MEDICARE

## 2019-09-16 VITALS
HEIGHT: 66 IN | BODY MASS INDEX: 42.02 KG/M2 | SYSTOLIC BLOOD PRESSURE: 141 MMHG | RESPIRATION RATE: 18 BRPM | WEIGHT: 261.44 LBS | DIASTOLIC BLOOD PRESSURE: 63 MMHG | HEART RATE: 58 BPM | TEMPERATURE: 97.4 F | OXYGEN SATURATION: 95 %

## 2019-09-16 DIAGNOSIS — Z09 SURGERY FOLLOW-UP: Primary | ICD-10-CM

## 2019-09-16 PROCEDURE — 77030020782 HC GWN BAIR PAWS FLX 3M -B: Performed by: OBSTETRICS & GYNECOLOGY

## 2019-09-16 PROCEDURE — 74011250636 HC RX REV CODE- 250/636

## 2019-09-16 PROCEDURE — 77030019927 HC TBNG IRR CYSTO BAXT -A: Performed by: OBSTETRICS & GYNECOLOGY

## 2019-09-16 PROCEDURE — 76060000031 HC ANESTHESIA FIRST 0.5 HR: Performed by: OBSTETRICS & GYNECOLOGY

## 2019-09-16 PROCEDURE — 88305 TISSUE EXAM BY PATHOLOGIST: CPT

## 2019-09-16 PROCEDURE — 77030012508 HC MSK AIRWY LMA AMBU -A: Performed by: ANESTHESIOLOGY

## 2019-09-16 PROCEDURE — 77030040361 HC SLV COMPR DVT MDII -B: Performed by: OBSTETRICS & GYNECOLOGY

## 2019-09-16 PROCEDURE — 74011250636 HC RX REV CODE- 250/636: Performed by: OBSTETRICS & GYNECOLOGY

## 2019-09-16 PROCEDURE — 76210000006 HC OR PH I REC 0.5 TO 1 HR: Performed by: OBSTETRICS & GYNECOLOGY

## 2019-09-16 PROCEDURE — 76010000154 HC OR TIME FIRST 0.5 HR: Performed by: OBSTETRICS & GYNECOLOGY

## 2019-09-16 PROCEDURE — 77030005537 HC CATH URETH BARD -A: Performed by: OBSTETRICS & GYNECOLOGY

## 2019-09-16 PROCEDURE — 74011000250 HC RX REV CODE- 250

## 2019-09-16 PROCEDURE — 77030020255 HC SOL INJ LR 1000ML BG: Performed by: OBSTETRICS & GYNECOLOGY

## 2019-09-16 PROCEDURE — 76210000021 HC REC RM PH II 0.5 TO 1 HR: Performed by: OBSTETRICS & GYNECOLOGY

## 2019-09-16 RX ORDER — LIDOCAINE HYDROCHLORIDE 20 MG/ML
INJECTION, SOLUTION EPIDURAL; INFILTRATION; INTRACAUDAL; PERINEURAL AS NEEDED
Status: DISCONTINUED | OUTPATIENT
Start: 2019-09-16 | End: 2019-09-16 | Stop reason: HOSPADM

## 2019-09-16 RX ORDER — SODIUM CHLORIDE, SODIUM LACTATE, POTASSIUM CHLORIDE, CALCIUM CHLORIDE 600; 310; 30; 20 MG/100ML; MG/100ML; MG/100ML; MG/100ML
125 INJECTION, SOLUTION INTRAVENOUS CONTINUOUS
Status: DISCONTINUED | OUTPATIENT
Start: 2019-09-16 | End: 2019-09-16 | Stop reason: HOSPADM

## 2019-09-16 RX ORDER — HYDROCODONE BITARTRATE AND ACETAMINOPHEN 5; 325 MG/1; MG/1
1 TABLET ORAL
Qty: 30 TAB | Refills: 0 | Status: SHIPPED | OUTPATIENT
Start: 2019-09-16 | End: 2019-09-19

## 2019-09-16 RX ORDER — ONDANSETRON 2 MG/ML
INJECTION INTRAMUSCULAR; INTRAVENOUS AS NEEDED
Status: DISCONTINUED | OUTPATIENT
Start: 2019-09-16 | End: 2019-09-16 | Stop reason: HOSPADM

## 2019-09-16 RX ORDER — FENTANYL CITRATE 50 UG/ML
INJECTION, SOLUTION INTRAMUSCULAR; INTRAVENOUS AS NEEDED
Status: DISCONTINUED | OUTPATIENT
Start: 2019-09-16 | End: 2019-09-16 | Stop reason: HOSPADM

## 2019-09-16 RX ORDER — PROPOFOL 10 MG/ML
INJECTION, EMULSION INTRAVENOUS AS NEEDED
Status: DISCONTINUED | OUTPATIENT
Start: 2019-09-16 | End: 2019-09-16 | Stop reason: HOSPADM

## 2019-09-16 RX ORDER — DEXAMETHASONE SODIUM PHOSPHATE 4 MG/ML
INJECTION, SOLUTION INTRA-ARTICULAR; INTRALESIONAL; INTRAMUSCULAR; INTRAVENOUS; SOFT TISSUE AS NEEDED
Status: DISCONTINUED | OUTPATIENT
Start: 2019-09-16 | End: 2019-09-16 | Stop reason: HOSPADM

## 2019-09-16 RX ADMIN — LIDOCAINE HYDROCHLORIDE 60 MG: 20 INJECTION, SOLUTION EPIDURAL; INFILTRATION; INTRACAUDAL; PERINEURAL at 11:16

## 2019-09-16 RX ADMIN — SODIUM CHLORIDE, SODIUM LACTATE, POTASSIUM CHLORIDE, AND CALCIUM CHLORIDE 125 ML/HR: 600; 310; 30; 20 INJECTION, SOLUTION INTRAVENOUS at 08:34

## 2019-09-16 RX ADMIN — DEXAMETHASONE SODIUM PHOSPHATE 4 MG: 4 INJECTION, SOLUTION INTRA-ARTICULAR; INTRALESIONAL; INTRAMUSCULAR; INTRAVENOUS; SOFT TISSUE at 11:22

## 2019-09-16 RX ADMIN — ONDANSETRON 4 MG: 2 INJECTION INTRAMUSCULAR; INTRAVENOUS at 11:22

## 2019-09-16 RX ADMIN — SODIUM CHLORIDE, SODIUM LACTATE, POTASSIUM CHLORIDE, AND CALCIUM CHLORIDE 125 ML/HR: 600; 310; 30; 20 INJECTION, SOLUTION INTRAVENOUS at 12:14

## 2019-09-16 RX ADMIN — PROPOFOL 150 MG: 10 INJECTION, EMULSION INTRAVENOUS at 11:16

## 2019-09-16 RX ADMIN — FENTANYL CITRATE 25 MCG: 50 INJECTION, SOLUTION INTRAMUSCULAR; INTRAVENOUS at 11:31

## 2019-09-16 NOTE — ANESTHESIA PREPROCEDURE EVALUATION
Relevant Problems   No relevant active problems       Anesthetic History   No history of anesthetic complications            Review of Systems / Medical History  Patient summary reviewed, nursing notes reviewed and pertinent labs reviewed    Pulmonary              Pertinent negatives: No COPD, asthma, recent URI and sleep apnea  Comments: Former smoker   Neuro/Psych       CVA  TIA  Pertinent negatives: No seizures and neuromuscular disease  Comments: Left sided weakness from stroke Cardiovascular    Hypertension: well controlled            Pertinent negatives: No past MI, CAD, PAD, dysrhythmias, angina and CHF  Exercise tolerance: <4 METS     GI/Hepatic/Renal     GERD        Pertinent negatives: No hepatitis, liver disease and renal disease  Comments: Rare, food specific GERD Endo/Other        Morbid obesity  Pertinent negatives: No diabetes, hypothyroidism, hyperthyroidism and blood dyscrasia   Other Findings            Physical Exam    Airway  Mallampati: III  TM Distance: 4 - 6 cm  Neck ROM: decreased range of motion   Mouth opening: Normal     Cardiovascular  Regular rate and rhythm,  S1 and S2 normal,  no murmur, click, rub, or gallop  Rhythm: regular  Rate: normal         Dental    Dentition: Edentulous     Pulmonary  Breath sounds clear to auscultation               Abdominal  GI exam deferred       Other Findings            Anesthetic Plan    ASA: 3  Anesthesia type: general          Induction: Intravenous  Anesthetic plan and risks discussed with: Patient and Family      GA/LMA

## 2019-09-16 NOTE — OP NOTES
OPERATIVE NOTE    Date of Procedure: 9/16/2019   Preoperative Diagnosis: POSTMENOPAUSAL BLEEDING  Postoperative Diagnosis: POSTMENOPAUSAL BLEEDING    Procedure(s):  DILATATION AND CURETTAGE HYSTEROSCOPY  Surgeon(s) and Role:     Brenda Long MD - Primary         Surgical Assistant: None    Surgical Staff:  Circ-1: Ruth Gutiérrez RN  Circ-2: Lilian Page  Circ-Relief: Erica Lynch RN  Scrub Tech-1: [facility]  Surg Asst-1: Nyasia Angelgallo  Event Time In Time Out   Incision Start 1130    Incision Close 1135      Anesthesia: General   Estimated Blood Loss: 50 cc  Specimens:   ID Type Source Tests Collected by Time Destination   1 : endometrial curettings Preservative Uterus  Brenda Long MD 9/16/2019 1131 Pathology      Findings: endometrial polyps   Complications: None  Implants: * No implants in log *    Procedure: The patient was taken to the operating room where she was placed in the supine position. After being placed under general anesthesia, she was placed up in the [facility] laparoscopy stirrups in the dorsal lithotomy position. The patient was then prepped and draped in the usual fashion and the Bentley catheter was placed into the bladder. Attention was first turned to the vagina where the speculum was placed in the vagina. The anterior lip of the cervix was grasped with a single-toothed tenaculum. The cervix was dilated to admit a diagnostic hysteroscope using normal saline for distention media. The above findings were noted. Curettage and Raphael-Stone forceps was performed and specimen was sent to pathology. All instruments were removed from the vagina. The patient was awakened, extubated and taken to the recovery room in good condition.

## 2019-09-16 NOTE — ANESTHESIA POSTPROCEDURE EVALUATION
Procedure(s):  DILATATION AND CURETTAGE HYSTEROSCOPY, \" SPEC POP\". general    Anesthesia Post Evaluation        Comments: Post-Anesthesia Evaluation and Assessment    Cardiovascular Function/Vital Signs  /79   Pulse (!) 58   Temp 37 °C (98.6 °F)   Resp 16   Ht 5' 6\" (1.676 m)   Wt 118.6 kg (261 lb 7 oz)   SpO2 96%   BMI 42.20 kg/m²     Patient is status post Procedure(s):  DILATATION AND CURETTAGE HYSTEROSCOPY, \" SPEC POP\". Nausea/Vomiting: Controlled. Postoperative hydration reviewed and adequate. Pain:  Pain Scale 1: Numeric (0 - 10) (09/16/19 1215)  Pain Intensity 1: 0 (09/16/19 1215)   Managed. Neurological Status:   Neuro (WDL): Exceptions to WDL (09/16/19 1215)   At baseline. Mental Status and Level of Consciousness: Arousable. Pulmonary Status:   O2 Device: Room air (09/16/19 1215)   Adequate oxygenation and airway patent. Complications related to anesthesia: None    Post-anesthesia assessment completed. No concerns. Patient has met all discharge requirements. Signed By: Catherine Goodson MD    September 16, 2019                   Vitals Value Taken Time   /64 9/16/2019 12:20 PM   Temp 37 °C (98.6 °F) 9/16/2019 11:42 AM   Pulse 58 9/16/2019 12:22 PM   Resp 15 9/16/2019 12:22 PM   SpO2 96 % 9/16/2019 12:22 PM   Vitals shown include unvalidated device data.

## 2019-09-16 NOTE — ROUTINE PROCESS
Patient has memory loss after having stroke. Patient unable to say what she is having done today. Patient not . Oldest daughter, Marjorie Padgett, signed consent.

## 2019-09-16 NOTE — PERIOP NOTES
Discharge instructions completed - opportunity for questions - AVS med list reviewed for duplicates - tolerating po fluids and crackers

## 2019-09-16 NOTE — PERIOP NOTES
Reviewed PTA medication list with patient/caregiver and patient/caregiver denies any additional medications. Patient admits to having a responsible adult care for them for at least 24 hours after surgery.     Dual skin assessment completed by Shiva Martínez RN and Patricio Johnson RN.

## 2019-09-16 NOTE — INTERVAL H&P NOTE
H&P Update:  Jm Beard was seen and examined. History and physical has been reviewed. The patient has been examined.  There have been no significant clinical changes since the completion of the originally dated History and Physical.

## 2019-09-16 NOTE — DISCHARGE INSTRUCTIONS
Patient Education        Hysteroscopy With Dilation and Curettage: What to Expect at [address]  For a hysteroscopy, your doctor guides a lighted tube through your cervix and into your uterus. This helps the doctor see inside your uterus. For a dilation and curettage (D&C), your doctor uses a curved tool, called a curette, to gently scrape tissue from your uterus. After these procedures, you are likely to have a backache or cramps similar to menstrual cramps. Expect to pass small clots of blood from your vagina for the first few days. You may have light vaginal bleeding for several weeks after the D&C. If the doctor filled your uterus with air, you may have gas pains or your belly may feel full. You may also have shoulder pain. These symptoms should go away in 1 to 2 days. You will probably be able to go back to most of your normal activities in 1 or 2 days. This care sheet gives you a general idea about how long it will take for you to recover. But each person recovers at a different pace. Follow the steps below to get better as quickly as possible. How can you care for yourself at home? Activity    · Rest when you feel tired.     · Most women are able to return to work the day after the procedure.     · Wear sanitary pads if needed. Do not douche or use tampons for 2 weeks or until your doctor says it is okay.     · Ask your doctor when it is okay for you to have sex.     · If you could become pregnant, talk about birth control with your doctor. Do not try to become pregnant until your doctor says it is okay. Diet    · You can eat your normal diet. If your stomach is upset, try bland, low-fat foods like plain rice, broiled chicken, toast, and yogurt.     · Drink plenty of fluids (unless your doctor tells you not to). Medicines    · Your doctor will tell you if and when you can restart your medicines.  He or she will also give you instructions about taking any new medicines.     · If you take blood thinners, such as warfarin (Coumadin), clopidogrel (Plavix), or aspirin, be sure to talk to your doctor. He or she will tell you if and when to start taking those medicines again. Make sure that you understand exactly what your doctor wants you to do.     · Be safe with medicines. Read and follow all instructions on the label. ? If the doctor gave you a prescription medicine for pain, take it as prescribed. ? If you are not taking a prescription pain medicine, ask your doctor if you can take an over-the-counter medicine.     · If your doctor prescribed antibiotics, take them as directed. Do not stop taking them just because you feel better. You need to take the full course of antibiotics. Follow-up care is a key part of your treatment and safety. Be sure to make and go to all appointments, and call your doctor if you are having problems. It's also a good idea to know your test results and keep a list of the medicines you take. When should you call for help? Call 911 anytime you think you may need emergency care. For example, call if:    · You passed out (lost consciousness).     · You have chest pain, are short of breath, or cough up blood.    Call your doctor now or seek immediate medical care if:    · You have pain that does not get better after you take pain medicine.     · You cannot pass stools or gas.     · You have bright red vaginal bleeding that soaks one or more pads in an hour, or you have large clots.     · You have a vaginal discharge that has increased or that smells bad.     · You are sick to your stomach or cannot drink fluids.     · You have symptoms of a blood clot in your leg (called a deep vein thrombosis), such as:  ? Pain in your calf, back of the knee, thigh, or groin. ? Redness and swelling in your leg.     · You have signs of infection, such as:  ? Increased pain, swelling, warmth, or redness. ? Red streaks leading from the incision. ? Pus draining from the incision. ?  A fever.    Watch closely for changes in your health, and be sure to contact your doctor if you have any problems. Where can you learn more? Go to http://elena-gerri.info/. Enter C952 in the search box to learn more about \"Hysteroscopy With Dilation and Curettage: What to Expect at Home. \"  Current as of: September 5, 2018  Content Version: 12.1  © 9892-5782 [employer]. Care instructions adapted under license by [employer] (which disclaims liability or warranty for this information). If you have questions about a medical condition or this instruction, always ask your healthcare professional. [place] [address] any warranty or liability for your use of this information. Resume pre hospital diet  Drink plenty of fluids  Take prescription as directed  Ambulate in house  Sanitary pads only  Shower tomorrow  Follow up with Dr. Oleg Bloom as scheduled        DISCHARGE SUMMARY from Nurse    PATIENT INSTRUCTIONS:    After general anesthesia or intravenous sedation, for 24 hours or while taking prescription Narcotics:  · Limit your activities  · Do not drive and operate hazardous machinery  · Do not make important personal or business decisions  · Do  not drink alcoholic beverages  · If you have not urinated within 8 hours after discharge, please contact your surgeon on call.     Report the following to your surgeon:  · Excessive pain, swelling, redness or odor of or around the surgical area  · Temperature over 100.5  · Nausea and vomiting lasting longer than 4 hours or if unable to take medications  · Any signs of decreased circulation or nerve impairment to extremity: change in color, persistent  numbness, tingling, coldness or increase pain  · Any questions    What to do at Home:  Recommended activity: Ambulate in house and No driving while on analgesics,     If you experience any of the following symptoms fever, chills, uncontrollable pain , active bleeding, please follow up with Dr. Edwige Garcia. *  Please give a list of your current medications to your Primary Care Provider. *  Please update this list whenever your medications are discontinued, doses are      changed, or new medications (including over-the-counter products) are added. *  Please carry medication information at all times in case of emergency situations. These are general instructions for a healthy lifestyle:    No smoking/ No tobacco products/ Avoid exposure to second hand smoke  Surgeon General's Warning:  Quitting smoking now greatly reduces serious risk to your health. Obesity, smoking, and sedentary lifestyle greatly increases your risk for illness    A healthy diet, regular physical exercise & weight monitoring are important for maintaining a healthy lifestyle    You may be retaining fluid if you have a history of heart failure or if you experience any of the following symptoms:  Weight gain of 3 pounds or more overnight or 5 pounds in a week, increased swelling in our hands or feet or shortness of breath while lying flat in bed. Please call your doctor as soon as you notice any of these symptoms; do not wait until your next office visit. Patient armband removed and shredded    The discharge information has been reviewed with the patient and caregiver. The patient and caregiver verbalized understanding. Discharge medications reviewed with the patient and caregiver and appropriate educational materials and side effects teaching were provided.   ___________________________________________________________________________________________________________________________________

## (undated) DEVICE — 4-PORT MANIFOLD: Brand: NEPTUNE 2

## (undated) DEVICE — CATH URETH INTMIT ROB 16FR FUN -- CONVERT TO ITEM 179520

## (undated) DEVICE — D&C HYSTEROSCOPY: Brand: MEDLINE INDUSTRIES, INC.

## (undated) DEVICE — STRAP,POSITIONING,KNEE/BODY,FOAM,4X60": Brand: MEDLINE

## (undated) DEVICE — SKIN MARKER,REGULAR TIP WITH RULER AND LABELS: Brand: DEVON

## (undated) DEVICE — PAD,NON-ADHERENT,3X8,STERILE,LF,1/PK: Brand: MEDLINE

## (undated) DEVICE — STERILE LATEX POWDER-FREE SURGICAL GLOVESWITH NITRILE COATING: Brand: PROTEXIS

## (undated) DEVICE — CYSTO PACK: Brand: MEDLINE INDUSTRIES, INC.

## (undated) DEVICE — SOLUTION IRRIG 3000ML H2O STRL BAG

## (undated) DEVICE — SOLUTION LACTATED RINGERS INJECTION USP

## (undated) DEVICE — GARMENT,MEDLINE,DVT,INT,CALF,MED, GEN2: Brand: MEDLINE

## (undated) DEVICE — REM POLYHESIVE ADULT PATIENT RETURN ELECTRODE: Brand: VALLEYLAB

## (undated) DEVICE — BASIC SINGLE BASIN 1-LF: Brand: MEDLINE INDUSTRIES, INC.

## (undated) DEVICE — KENDALL SCD EXPRESS SLEEVES, KNEE LENGTH, MEDIUM: Brand: KENDALL SCD

## (undated) DEVICE — CYSTO/BLADDER IRRIGATION SET, REGULATING CLAMP

## (undated) DEVICE — STERILE POLYISOPRENE POWDER-FREE SURGICAL GLOVES: Brand: PROTEXIS